# Patient Record
Sex: MALE | Race: WHITE | NOT HISPANIC OR LATINO | Employment: FULL TIME | URBAN - METROPOLITAN AREA
[De-identification: names, ages, dates, MRNs, and addresses within clinical notes are randomized per-mention and may not be internally consistent; named-entity substitution may affect disease eponyms.]

---

## 2020-01-04 ENCOUNTER — OFFICE VISIT (OUTPATIENT)
Dept: URGENT CARE | Facility: CLINIC | Age: 45
End: 2020-01-04
Payer: COMMERCIAL

## 2020-01-04 VITALS
OXYGEN SATURATION: 95 % | TEMPERATURE: 96.9 F | HEIGHT: 69 IN | WEIGHT: 289 LBS | SYSTOLIC BLOOD PRESSURE: 114 MMHG | HEART RATE: 103 BPM | BODY MASS INDEX: 42.8 KG/M2 | DIASTOLIC BLOOD PRESSURE: 68 MMHG | RESPIRATION RATE: 15 BRPM

## 2020-01-04 DIAGNOSIS — J22 LOWER RESPIRATORY INFECTION: ICD-10-CM

## 2020-01-04 DIAGNOSIS — H66.91 RIGHT OTITIS MEDIA, UNSPECIFIED OTITIS MEDIA TYPE: ICD-10-CM

## 2020-01-04 PROCEDURE — 99214 OFFICE O/P EST MOD 30 MIN: CPT | Performed by: FAMILY MEDICINE

## 2020-01-04 RX ORDER — AMOXICILLIN 875 MG/1
875 TABLET, COATED ORAL 2 TIMES DAILY
Qty: 14 TAB | Refills: 0 | Status: SHIPPED | OUTPATIENT
Start: 2020-01-04 | End: 2020-01-11

## 2020-01-04 ASSESSMENT — ENCOUNTER SYMPTOMS
SHORTNESS OF BREATH: 0
COUGH: 1

## 2020-01-04 NOTE — PROGRESS NOTES
"Subjective:   Ramsey Flores is a 44 y.o. male who presents for Earache and Cough (x 3 weeks)        44-year-old male presents to the urgent care with a chief complaint of sensation of bilateral ear fluid over the past 2 days.  Patient initially experienced fever 3 weeks prior consistent with community exposure to influenza and has an intermittent persistent cough since that time.  The cough is precipitated by deep inspiration.    Cough   The cough is productive of sputum. Pertinent negatives include no shortness of breath. Risk factors: Community exposure to influenza.     Review of Systems   Respiratory: Positive for cough. Negative for shortness of breath.      No Known Allergies   Objective:   /68   Pulse (!) 103   Temp 36.1 °C (96.9 °F) (Temporal)   Resp 15   Ht 1.753 m (5' 9\")   Wt (!) 131.1 kg (289 lb)   SpO2 95%   BMI 42.68 kg/m²   Physical Exam  Vitals signs and nursing note reviewed.   Constitutional:       General: He is not in acute distress.     Appearance: He is well-developed.   HENT:      Head: Normocephalic and atraumatic.      Right Ear: External ear normal. Tympanic membrane is erythematous and bulging.      Left Ear: External ear normal. Tympanic membrane is erythematous. Tympanic membrane is not bulging.      Nose: Nose normal.      Mouth/Throat:      Mouth: Mucous membranes are moist.   Eyes:      Conjunctiva/sclera: Conjunctivae normal.      Pupils: Pupils are equal, round, and reactive to light.   Cardiovascular:      Rate and Rhythm: Normal rate and regular rhythm.      Heart sounds: No murmur.   Pulmonary:      Effort: Pulmonary effort is normal. No respiratory distress.      Breath sounds: Normal breath sounds.   Abdominal:      General: There is no distension.      Palpations: Abdomen is soft.      Tenderness: There is no tenderness.   Musculoskeletal: Normal range of motion.   Skin:     General: Skin is warm and dry.   Neurological:      General: No focal deficit " present.      Mental Status: He is alert and oriented to person, place, and time. Mental status is at baseline.      Gait: Gait (gait at baseline) normal.   Psychiatric:         Judgment: Judgment normal.           Assessment/Plan:   1. Right otitis media, unspecified otitis media type  - amoxicillin (AMOXIL) 875 MG tablet; Take 1 Tab by mouth 2 times a day for 7 days.  Dispense: 14 Tab; Refill: 0    2. Lower respiratory infection      Discussed close monitoring, return precautions, and supportive measures including maintaining adequate fluid hydration and caloric intake, relative rest and OTC symptom management including acetaminophen as needed for pain and/or fever.    Differential diagnosis, natural history, supportive care, and indications for immediate follow-up discussed.     Advised the patient to follow-up with the primary care physician for recheck, reevaluation, and consideration of further management.